# Patient Record
Sex: MALE | Race: WHITE | NOT HISPANIC OR LATINO | URBAN - METROPOLITAN AREA
[De-identification: names, ages, dates, MRNs, and addresses within clinical notes are randomized per-mention and may not be internally consistent; named-entity substitution may affect disease eponyms.]

---

## 2023-09-24 ENCOUNTER — HOSPITAL ENCOUNTER (EMERGENCY)
Facility: HOSPITAL | Age: 38
Discharge: HOME | End: 2023-09-24
Attending: EMERGENCY MEDICINE | Admitting: INTERNAL MEDICINE
Payer: COMMERCIAL

## 2023-09-24 VITALS
HEART RATE: 125 BPM | RESPIRATION RATE: 18 BRPM | DIASTOLIC BLOOD PRESSURE: 95 MMHG | TEMPERATURE: 98.2 F | OXYGEN SATURATION: 96 % | SYSTOLIC BLOOD PRESSURE: 160 MMHG

## 2023-09-24 DIAGNOSIS — F10.10 ALCOHOL ABUSE: ICD-10-CM

## 2023-09-24 DIAGNOSIS — F32.A DEPRESSION: ICD-10-CM

## 2023-09-24 DIAGNOSIS — R45.851 SUICIDAL IDEATION: Primary | ICD-10-CM

## 2023-09-24 LAB
AMPHETAMINES SERPL QL SCN: NEGATIVE
BARBITURATES UR QL: NEGATIVE
BENZODIAZ UR QL: NEGATIVE
COCAINE UR QL: NEGATIVE
ETHANOL EXG-MCNC: 0.1 MG/DL
OPIATES UR QL SCN: NEGATIVE
OXYCODONE+OXYMORPHONE UR QL SCN: NEGATIVE
PCP UR QL: NEGATIVE
THC UR QL: POSITIVE

## 2023-09-24 PROCEDURE — 99284 EMERGENCY DEPT VISIT MOD MDM: CPT

## 2023-09-24 PROCEDURE — 99285 EMERGENCY DEPT VISIT HI MDM: CPT | Performed by: EMERGENCY MEDICINE

## 2023-09-24 PROCEDURE — 82075 ASSAY OF BREATH ETHANOL: CPT | Performed by: EMERGENCY MEDICINE

## 2023-09-24 PROCEDURE — 80307 DRUG TEST PRSMV CHEM ANLYZR: CPT | Performed by: EMERGENCY MEDICINE

## 2023-09-24 RX ORDER — IBUPROFEN 600 MG/1
600 TABLET ORAL ONCE
Status: COMPLETED | OUTPATIENT
Start: 2023-09-24 | End: 2023-09-24

## 2023-09-24 RX ORDER — ALPRAZOLAM 0.5 MG/1
0.5 TABLET ORAL ONCE
Status: COMPLETED | OUTPATIENT
Start: 2023-09-24 | End: 2023-09-24

## 2023-09-24 RX ORDER — DIAZEPAM 5 MG/1
10 TABLET ORAL ONCE
Status: COMPLETED | OUTPATIENT
Start: 2023-09-24 | End: 2023-09-24

## 2023-09-24 RX ORDER — NALTREXONE HYDROCHLORIDE 50 MG/1
50 TABLET, FILM COATED ORAL DAILY
COMMUNITY

## 2023-09-24 RX ORDER — OLANZAPINE 5 MG/1
10 TABLET, ORALLY DISINTEGRATING ORAL ONCE
Status: COMPLETED | OUTPATIENT
Start: 2023-09-24 | End: 2023-09-24

## 2023-09-24 RX ORDER — HYDROXYZINE PAMOATE 25 MG/1
25 CAPSULE ORAL 3 TIMES DAILY PRN
COMMUNITY

## 2023-09-24 RX ORDER — TRAZODONE HYDROCHLORIDE 50 MG/1
50 TABLET ORAL
COMMUNITY

## 2023-09-24 RX ORDER — FLUOXETINE HYDROCHLORIDE 20 MG/1
60 CAPSULE ORAL DAILY
COMMUNITY

## 2023-09-24 RX ORDER — HYDROCHLOROTHIAZIDE 12.5 MG/1
12.5 CAPSULE, GELATIN COATED ORAL DAILY
COMMUNITY

## 2023-09-24 RX ORDER — OLANZAPINE 7.5 MG/1
7.5 TABLET ORAL
COMMUNITY

## 2023-09-24 RX ADMIN — OLANZAPINE 10 MG: 5 TABLET, ORALLY DISINTEGRATING ORAL at 02:53

## 2023-09-24 RX ADMIN — ALPRAZOLAM 0.5 MG: 0.5 TABLET ORAL at 10:56

## 2023-09-24 RX ADMIN — DIAZEPAM 10 MG: 5 TABLET ORAL at 00:59

## 2023-09-24 RX ADMIN — DIAZEPAM 10 MG: 5 TABLET ORAL at 05:11

## 2023-09-24 RX ADMIN — IBUPROFEN 600 MG: 600 TABLET, FILM COATED ORAL at 00:59

## 2023-09-24 NOTE — ED NOTES
Per Francisca with Crisis, patient is OK to be off 1:1 and have belongings returned at this time. ED tech provided pt his belongings, pt is OKd to get changed into personal clothes. Call bell in reach. 1:1 DCd at this time. A/w CATCH call back.      Olegario Chacko RN  09/24/23 6133

## 2023-09-24 NOTE — ED NOTES
Patient given paper and a crayon to "write down my thoughts" with per request. Patient requesting to speak to a counselor, patient informed that our crisis workers do not come in until the morning. Patient agreeable with speaking to crisis in AM. Patient was escorted to the restroom with a tech and returned to his room without issue. Virtual 1:1 continued.       Meet Gil, TAYLOR  09/24/23 4283

## 2023-09-24 NOTE — ED NOTES
Pt is in bed, call bell in reach. Pt in no acute distress, virtual 1:1 monitoring to be continued. Pt denies having any other needs at this time, call strickland in reach.      Magan Bedolla RN  09/24/23 5241

## 2023-09-24 NOTE — ED NOTES
Patient arrived last night intoxicated and endorsing SI. Patient was seen by [facility] in the am after sober. Patient denied SI and HI. He stated that he only feels that way when he drinks. Patient was agreeable to rehab. EDMD put in consult for CATCH.

## 2023-09-24 NOTE — ED NOTES
Patient given sandwich, pudding, applesauce and chips with cranberry juice per request.      Emile Burgos RN  09/24/23 0345

## 2023-09-24 NOTE — DISCHARGE INSTRUCTIONS
FOLLOW up with [facility] . Stop drinking alcohol. Labs Reviewed   RAPID DRUG SCREEN, URINE - Abnormal       Result Value Ref Range Status    Amph/Meth UR Negative  Negative Final    Barbiturate Ur Negative  Negative Final    Benzodiazepine Urine Negative  Negative Final    Cocaine Urine Negative  Negative Final    Methadone Urine     Final    Opiate Urine Negative  Negative Final    PCP Ur Negative  Negative Final    THC Urine Positive (*) Negative Final    Oxycodone Urine Negative  Negative Final    Narrative:     No methadone test performed, if required call laboratory  Presumptive report. If requested, specimen will be sent to reference lab for confirmation. FOR MEDICAL PURPOSES ONLY. IF CONFIRMATION NEEDED PLEASE CONTACT THE LAB WITHIN 5 DAYS.     Drug Screen Cutoff Levels:  AMPHETAMINE/METHAMPHETAMINES  1000 ng/mL  BARBITURATES     200 ng/mL  BENZODIAZEPINES     200 ng/mL  COCAINE      300 ng/mL  METHADONE      300 ng/mL  OPIATES      300 ng/mL  PHENCYCLIDINE     25 ng/mL  THC       50 ng/mL  OXYCODONE      100 ng/mL   POCT ALCOHOL BREATH TEST - Abnormal    EXTBreath Alcohol 0.095   Final

## 2023-09-24 NOTE — ED NOTES
Pt ambulated to and from BR with steady gait with RN accompanying pt. Pt now back in room, in bed with call bell in reach. Virtual 1:1 Eudelia Hemming will continue. Pt denies having needs.       Henrietta Dobbs RN  09/24/23 6126

## 2023-09-24 NOTE — ED PROVIDER NOTES
History  Chief Complaint   Patient presents with   • Alcohol Intoxication     Patient arrives via EMS for alcohol intoxication and psych eval.      46 y/o male with hx of HTN, depression, and alcohol abuse presents to the ED via EMS for evaluation of depression and suicidal ideation. The patient states that he has had a longstanding history of alcohol abuse, with a 13-month period of sobriety that ended when he relapsed in April 2023 (6 months ago). He drinks approximately 12 alcoholic beverages per day (estimated 5% ABV per drink) and states that he last drank earlier tonight while at home. The patient took a nap and woke up and noticed increasing feelings of depression and suicidal ideation without plan and he decided to come to the ER for evaluation. He has been admitted in the past for dual diagnosis and thinks he may need inpatient treatment for both his alcohol abuse as well as his depression. He denies any current plan or recent attempts at self-harm. No homicidal ideation or hallucinations. He was most recently admitted for alcohol detox and rehabilitation 7/29/23, for which she was in detox for approximately 1 week followed by long-term rehab for 28 days, after which he checked himself out early. He states that he made a mistake in checking out early because he relapsed on his alcohol abuse soon thereafter and has been drinking daily since that time. No current physical symptoms or complaints. Patient does report a history of alcohol withdrawal and delirium tremens, however he denies any other symptoms at this time. Prior to Admission Medications   Prescriptions Last Dose Informant Patient Reported? Taking?    FLUoxetine (PROzac) 20 mg capsule   Yes Yes   Sig: Take 60 mg by mouth daily   OLANZapine (ZyPREXA) 7.5 mg tablet 9/23/2023  Yes Yes   Sig: Take 7.5 mg by mouth daily at bedtime   hydrOXYzine pamoate (VISTARIL) 25 mg capsule 9/24/2023  Yes Yes   Sig: Take 25 mg by mouth 3 (three) times a day as needed for itching   hydrochlorothiazide (MICROZIDE) 12.5 mg capsule   Yes Yes   Sig: Take 12.5 mg by mouth daily   naltrexone (REVIA) 50 mg tablet  Self Yes No   Sig: Take 50 mg by mouth daily   traZODone (DESYREL) 50 mg tablet 9/24/2023 Self Yes Yes   Sig: Take 50 mg by mouth daily at bedtime      Facility-Administered Medications: None       Past Medical History:   Diagnosis Date   • Hypertension        History reviewed. No pertinent surgical history. History reviewed. No pertinent family history. I have reviewed and agree with the history as documented. E-Cigarette/Vaping     E-Cigarette/Vaping Substances     Social History     Tobacco Use   • Smoking status: Some Days     Types: Cigarettes   Substance Use Topics   • Alcohol use: Yes     Alcohol/week: 12.0 standard drinks of alcohol     Types: 12 Cans of beer per week   • Drug use: Yes     Types: Marijuana       Review of Systems   Constitutional: Negative for chills and fever. HENT: Negative for congestion, rhinorrhea and sore throat. Respiratory: Negative for cough and shortness of breath. Cardiovascular: Negative for chest pain and palpitations. Gastrointestinal: Negative for abdominal pain, diarrhea, nausea and vomiting. Genitourinary: Negative for dysuria and hematuria. Musculoskeletal: Negative for back pain and neck pain. Neurological: Negative for weakness, light-headedness, numbness and headaches. Psychiatric/Behavioral: Positive for suicidal ideas. Negative for self-injury. See HPI. All other systems reviewed and are negative. Physical Exam  Physical Exam  Vitals and nursing note reviewed. Constitutional:       General: He is not in acute distress. Appearance: Normal appearance. He is normal weight. HENT:      Head: Normocephalic and atraumatic.       Right Ear: External ear normal.      Left Ear: External ear normal.      Nose: Nose normal.      Mouth/Throat:      Mouth: Mucous membranes are moist.      Pharynx: Oropharynx is clear. No oropharyngeal exudate or posterior oropharyngeal erythema. Eyes:      Extraocular Movements: Extraocular movements intact. Conjunctiva/sclera: Conjunctivae normal.      Pupils: Pupils are equal, round, and reactive to light. Cardiovascular:      Rate and Rhythm: Regular rhythm. Tachycardia present. Pulses: Normal pulses. Heart sounds: Normal heart sounds. Pulmonary:      Effort: Pulmonary effort is normal. No respiratory distress. Breath sounds: Normal breath sounds. No wheezing or rales. Abdominal:      General: Abdomen is flat. Bowel sounds are normal. There is no distension. Palpations: Abdomen is soft. Tenderness: There is no abdominal tenderness. There is no right CVA tenderness, left CVA tenderness or guarding. Musculoskeletal:         General: No swelling or tenderness. Normal range of motion. Cervical back: Normal range of motion and neck supple. No tenderness. Skin:     General: Skin is warm and dry. Neurological:      General: No focal deficit present. Mental Status: He is alert and oriented to person, place, and time. Sensory: No sensory deficit. Motor: No weakness. Comments: Awake, alert and oriented x3, no focal deficits. Answering questions and following commands. Fluid speech. No current tremor. Psychiatric:      Comments: Reports suicidal ideation without specific plan. No HI or hallucinations.          Vital Signs  ED Triage Vitals   Temperature Pulse Respirations Blood Pressure SpO2   09/24/23 0040 09/24/23 0040 09/24/23 0040 09/24/23 0040 09/24/23 0040   98.1 °F (36.7 °C) (!) 127 18 (!) 165/124 97 %      Temp Source Heart Rate Source Patient Position - Orthostatic VS BP Location FiO2 (%)   09/24/23 0040 09/24/23 0040 09/24/23 0040 09/24/23 0040 --   Oral Monitor Sitting Left arm       Pain Score       09/24/23 0059       8           Vitals:    09/24/23 0712 09/24/23 1037 09/24/23 1038 09/24/23 1256   BP: 123/81 151/97 151/97 160/95   Pulse: 72 96 96 (!) 125   Patient Position - Orthostatic VS: Sitting Sitting  Sitting         Visual Acuity      ED Medications  Medications   diazepam (VALIUM) tablet 10 mg (10 mg Oral Given 9/24/23 0059)   ibuprofen (MOTRIN) tablet 600 mg (600 mg Oral Given 9/24/23 0059)   OLANZapine (ZyPREXA ZYDIS) dispersible tablet 10 mg (10 mg Oral Given 9/24/23 0253)   diazepam (VALIUM) tablet 10 mg (10 mg Oral Given 9/24/23 0511)   ALPRAZolam [place]) tablet 0.5 mg (0.5 mg Oral Given 9/24/23 1056)       Diagnostic Studies  Results Reviewed     Procedure Component Value Units Date/Time    Rapid drug screen, urine [[id number]]  (Abnormal) Collected: 09/24/23 0102    Lab Status: Final result Specimen: Urine, Clean Catch Updated: 09/24/23 0134     Amph/Meth UR Negative     Barbiturate Ur Negative     Benzodiazepine Urine Negative     Cocaine Urine Negative     Methadone Urine --     Opiate Urine Negative     PCP Ur Negative     THC Urine Positive     Oxycodone Urine Negative    Narrative:      No methadone test performed, if required call laboratory  Presumptive report. If requested, specimen will be sent to reference lab for confirmation. FOR MEDICAL PURPOSES ONLY. IF CONFIRMATION NEEDED PLEASE CONTACT THE LAB WITHIN 5 DAYS.     Drug Screen Cutoff Levels:  AMPHETAMINE/METHAMPHETAMINES  1000 ng/mL  BARBITURATES     200 ng/mL  BENZODIAZEPINES     200 ng/mL  COCAINE      300 ng/mL  METHADONE      300 ng/mL  OPIATES      300 ng/mL  PHENCYCLIDINE     25 ng/mL  THC       50 ng/mL  OXYCODONE      100 ng/mL    POCT alcohol breath test [[id number]]  (Abnormal) Resulted: 09/24/23 0101    Lab Status: Final result Updated: 09/24/23 0101     EXTBreath Alcohol 0.095                 No orders to display              Procedures  Procedures         ED Course  ED Course as of 09/24/23 1916   Sun Sep 24, 2023   0102 EXTBreath Alcohol: 0.095   0135 AMPH/METH: Negative   0135 BARBITURATE URINE: Negative   0135 BENZODIAZEPINE URINE: Negative   0135 COCAINE URINE: Negative   0135 OPIATE URINE: Negative   0135 PHENCYCLIDINE URINE: Negative   0135 THC URINE(!): Positive   0135 Oxycodone Urine: Negative   0143 Medically cleared for behavioral health evaluation. SBIRT 20yo+    Flowsheet Row Most Recent Value   Initial Alcohol Screen: US AUDIT-C     1. How often do you have a drink containing alcohol? 6 Filed at: 09/24/2023 1044   2. How many drinks containing alcohol do you have on a typical day you are drinking? 6 Filed at: 09/24/2023 1044   3a. Male UNDER 65: How often do you have five or more drinks on one occasion? 6 Filed at: 09/24/2023 1044   Audit-C Score 18 Filed at: 09/24/2023 1044   Full Alcohol Screen: US AUDIT    4. How often during the last year have you found that you were not able to stop drinking once you had started? 4 Filed at: 09/24/2023 1044   5. How often during past year have you failed to do what was normally expected of you because of drinking? 3 Filed at: 09/24/2023 1044   6. How often in past year have you needed a first drink in the morning to get yourself going after a heavy drinking session? 3 Filed at: 09/24/2023 1044   7. How often in past year have you had feeling of guilt or remorse after drinking? 4 Filed at: 09/24/2023 1044   8. How often in past year have you been unable to remember what happened night before because you had been drinking? 3 Filed at: 09/24/2023 1044   9. Have you or someone else been injured as a result of your drinking? 0 Filed at: 09/24/2023 1044   10. Has a relative, friend, doctor or other health worker been concerned about your drinking and suggested you cut down? 4 Filed at: 09/24/2023 1044   AUDIT Total Score 39 Filed at: 09/24/2023 1044   LIVAN: How many times in the past year have you. .. Used an illegal drug or used a prescription medication for non-medical reasons?  Never Filed at: 09/24/2023 1044 Medical Decision Making  44 y/o male with hx of HTN, depression, and alcohol abuse presents to the ED via EMS for evaluation of depression and suicidal ideation. The patient states that he has had a longstanding history of alcohol abuse, with a 13-month period of sobriety that ended when he relapsed in April 2023 (6 months ago). He drinks approximately 12 alcoholic beverages per day (estimated 5% ABV per drink) and states that he last drank earlier tonight while at home. The patient took a nap and woke up and noticed increasing feelings of depression and suicidal ideation without plan and he decided to come to the ER for evaluation. He has been admitted in the past for dual diagnosis and thinks he may need inpatient treatment for both his alcohol abuse as well as his depression. He denies any current plan or recent attempts at self-harm. No homicidal ideation or hallucinations. He was most recently admitted for alcohol detox and rehabilitation 7/29/23, for which she was in detox for approximately 1 week followed by long-term rehab for 28 days, after which he checked himself out early. He states that he made a mistake in checking out early because he relapsed on his alcohol abuse soon thereafter and has been drinking daily since that time. No current physical symptoms or complaints. Patient does report a history of alcohol withdrawal and delirium tremens, however he denies any other symptoms at this time. Vital signs reviewed. Mildly hypotensive and tachycardic. See physical exam documentation for full exam findings. CIWA score 9. Valium PO ordered. Alcohol breathalyzer and UDS ordered, plan for crisis consult in morning for likely dual diagnosis placement. Patient signed out at end of shift prior to crisis evaluation.   Patient was subsequently seen and eval by crisis, reported resolution of his SI which he noted occurs when he drinks, and was agreeable to [facility] referral for alcohol detox and rehab. Amount and/or Complexity of Data Reviewed  Labs: ordered. Decision-making details documented in ED Course. Risk  Prescription drug management. Disposition  Final diagnoses:   Suicidal ideation   Depression   Alcohol abuse     Time reflects when diagnosis was documented in both MDM as applicable and the Disposition within this note     Time User Action Codes Description Comment    9/24/2023  1:42 AM Jyl Nearing Add [Q93.957] Suicidal ideation     9/24/2023  1:42 AM Jyl Nearing Add [email or web address] A] Depression     9/24/2023  1:42 AM Jyl Nearing Add [F10.10] Alcohol abuse       ED Disposition     ED Disposition   Discharge    Condition   Stable    Date/Time   Sun Sep 24, 2023  1:33 PM    Comment   Joshua Uribe should be transferred out to [address] [place] [address] and has been medically cleared. Follow-up Information     Follow up With Specialties Details Why Contact Info Additional Information    [facility] [address] [facility] In 1 week  [facility] [address] [facility] [address] [phone]  [id number] [id number] [facility],[address], [address] [place], [place], [address], [phone]          Discharge Medication List as of 9/24/2023  1:33 PM      CONTINUE these medications which have NOT CHANGED    Details   FLUoxetine (PROzac) 20 mg capsule Take 60 mg by mouth daily, Historical Med      hydrochlorothiazide (MICROZIDE) 12.5 mg capsule Take 12.5 mg by mouth daily, Historical Med      hydrOXYzine pamoate (VISTARIL) 25 mg capsule Take 25 mg by mouth 3 (three) times a day as needed for itching, Historical Med      OLANZapine (ZyPREXA) 7.5 mg tablet Take 7.5 mg by mouth daily at bedtime, Historical Med      traZODone (DESYREL) 50 mg tablet Take 50 mg by mouth daily at bedtime, Historical Med      naltrexone (REVIA) 50 mg tablet Take 50 mg by mouth daily, Historical Med             No discharge procedures on file.     PDMP Review None          ED Provider  Electronically Signed by           Waldemar Ashby MD  09/24/23 2363

## 2023-09-24 NOTE — ED NOTES
BAT 0.00 at this time. Pt denies having any needs at this time.  Crisis worker aware BAT 0 and is now at bedside for eval.     Reed Calderon RN  09/24/23 6860

## 2023-09-25 NOTE — ED CARE HANDOFF
[address] Warm Handoff Outcome Note    Patient name Reinier Bryant  Location ED 04/ED 04 MRN [id number]  Age: 45 y.o. Plan Type:   Warm Handoff                                                                                    Plan Date: 9/25/2023  Service:  ED Warm Handoff      Substance Use History:  ETOH    Warm Handoff Update:  Pt d/c home    Warm Handoff Outcome: Treatment Related Resources

## 2024-05-24 ENCOUNTER — APPOINTMENT (OUTPATIENT)
Dept: LAB | Facility: HOSPITAL | Age: 39
End: 2024-05-24
Payer: COMMERCIAL

## 2024-05-24 DIAGNOSIS — I10 ESSENTIAL (PRIMARY) HYPERTENSION: ICD-10-CM

## 2024-05-24 DIAGNOSIS — F32.A DEPRESSION, UNSPECIFIED DEPRESSION TYPE: ICD-10-CM

## 2024-05-24 LAB
ALBUMIN SERPL BCP-MCNC: 4.6 G/DL (ref 3.5–5)
ALP SERPL-CCNC: 51 U/L (ref 34–104)
ALT SERPL W P-5'-P-CCNC: 24 U/L (ref 7–52)
ANION GAP SERPL CALCULATED.3IONS-SCNC: 12 MMOL/L (ref 4–13)
AST SERPL W P-5'-P-CCNC: 20 U/L (ref 13–39)
BASOPHILS # BLD AUTO: 0.04 THOUSANDS/ÂΜL (ref 0–0.1)
BASOPHILS NFR BLD AUTO: 0 % (ref 0–1)
BILIRUB SERPL-MCNC: 0.31 MG/DL (ref 0.2–1)
BUN SERPL-MCNC: 12 MG/DL (ref 5–25)
CALCIUM SERPL-MCNC: 9.5 MG/DL (ref 8.4–10.2)
CHLORIDE SERPL-SCNC: 105 MMOL/L (ref 96–108)
CHOLEST SERPL-MCNC: 181 MG/DL
CO2 SERPL-SCNC: 23 MMOL/L (ref 21–32)
CREAT SERPL-MCNC: 1.1 MG/DL (ref 0.6–1.3)
EOSINOPHIL # BLD AUTO: 0.17 THOUSAND/ÂΜL (ref 0–0.61)
EOSINOPHIL NFR BLD AUTO: 2 % (ref 0–6)
ERYTHROCYTE [DISTWIDTH] IN BLOOD BY AUTOMATED COUNT: 12.9 % (ref 11.6–15.1)
GFR SERPL CREATININE-BSD FRML MDRD: 84 ML/MIN/1.73SQ M
GLUCOSE P FAST SERPL-MCNC: 107 MG/DL (ref 65–99)
HCT VFR BLD AUTO: 43.5 % (ref 36.5–49.3)
HDLC SERPL-MCNC: 47 MG/DL
HGB BLD-MCNC: 14.8 G/DL (ref 12–17)
IMM GRANULOCYTES # BLD AUTO: 0.03 THOUSAND/UL (ref 0–0.2)
IMM GRANULOCYTES NFR BLD AUTO: 0 % (ref 0–2)
LDLC SERPL CALC-MCNC: 106 MG/DL (ref 0–100)
LYMPHOCYTES # BLD AUTO: 2.22 THOUSANDS/ÂΜL (ref 0.6–4.47)
LYMPHOCYTES NFR BLD AUTO: 25 % (ref 14–44)
MCH RBC QN AUTO: 29.8 PG (ref 26.8–34.3)
MCHC RBC AUTO-ENTMCNC: 34 G/DL (ref 31.4–37.4)
MCV RBC AUTO: 88 FL (ref 82–98)
MONOCYTES # BLD AUTO: 0.61 THOUSAND/ÂΜL (ref 0.17–1.22)
MONOCYTES NFR BLD AUTO: 7 % (ref 4–12)
NEUTROPHILS # BLD AUTO: 5.88 THOUSANDS/ÂΜL (ref 1.85–7.62)
NEUTS SEG NFR BLD AUTO: 66 % (ref 43–75)
NONHDLC SERPL-MCNC: 134 MG/DL
NRBC BLD AUTO-RTO: 0 /100 WBCS
PLATELET # BLD AUTO: 274 THOUSANDS/UL (ref 149–390)
PMV BLD AUTO: 10.6 FL (ref 8.9–12.7)
POTASSIUM SERPL-SCNC: 4.1 MMOL/L (ref 3.5–5.3)
PROT SERPL-MCNC: 7.3 G/DL (ref 6.4–8.4)
RBC # BLD AUTO: 4.97 MILLION/UL (ref 3.88–5.62)
SODIUM SERPL-SCNC: 140 MMOL/L (ref 135–147)
TRIGL SERPL-MCNC: 142 MG/DL
TSH SERPL DL<=0.05 MIU/L-ACNC: 2.07 UIU/ML (ref 0.45–4.5)
WBC # BLD AUTO: 8.95 THOUSAND/UL (ref 4.31–10.16)

## 2024-05-24 PROCEDURE — 85025 COMPLETE CBC W/AUTO DIFF WBC: CPT

## 2024-05-24 PROCEDURE — 84443 ASSAY THYROID STIM HORMONE: CPT

## 2024-05-24 PROCEDURE — 80061 LIPID PANEL: CPT

## 2024-05-24 PROCEDURE — 80053 COMPREHEN METABOLIC PANEL: CPT

## 2024-05-24 PROCEDURE — 36415 COLL VENOUS BLD VENIPUNCTURE: CPT
